# Patient Record
Sex: MALE | URBAN - METROPOLITAN AREA
[De-identification: names, ages, dates, MRNs, and addresses within clinical notes are randomized per-mention and may not be internally consistent; named-entity substitution may affect disease eponyms.]

---

## 2023-01-09 ENCOUNTER — OFFICE VISIT (OUTPATIENT)
Dept: URBAN - METROPOLITAN AREA CLINIC 84 | Facility: CLINIC | Age: 48
End: 2023-01-09
Payer: COMMERCIAL

## 2023-01-09 ENCOUNTER — DASHBOARD ENCOUNTERS (OUTPATIENT)
Age: 48
End: 2023-01-09

## 2023-01-09 VITALS
BODY MASS INDEX: 48.12 KG/M2 | SYSTOLIC BLOOD PRESSURE: 126 MMHG | HEIGHT: 67 IN | WEIGHT: 306.6 LBS | DIASTOLIC BLOOD PRESSURE: 83 MMHG | HEART RATE: 96 BPM | TEMPERATURE: 97.6 F

## 2023-01-09 DIAGNOSIS — Z12.11 COLON CANCER SCREENING: ICD-10-CM

## 2023-01-09 PROCEDURE — 99203 OFFICE O/P NEW LOW 30 MIN: CPT | Performed by: INTERNAL MEDICINE

## 2023-01-09 PROCEDURE — 99202 OFFICE O/P NEW SF 15 MIN: CPT | Performed by: INTERNAL MEDICINE

## 2023-01-09 RX ORDER — FLUTICASONE PROPIONATE 50 UG/1
SPRAY, METERED NASAL
Qty: 16 EACH | Status: ON HOLD | COMMUNITY

## 2023-01-09 RX ORDER — PAROXETINE HYDROCHLORIDE HEMIHYDRATE 40 MG/1
TABLET, FILM COATED ORAL
Qty: 45 TABLET | Status: ON HOLD | COMMUNITY

## 2023-01-09 RX ORDER — ATORVASTATIN CALCIUM 10 MG/1
TABLET, FILM COATED ORAL
Qty: 90 EACH | Status: ACTIVE | COMMUNITY

## 2023-01-09 RX ORDER — DULAGLUTIDE 0.75 MG/.5ML
INJECT 0.5 ML'S UNDER SKIN EVERY 7 DAYS FOR 30 DAYS INJECTION, SOLUTION SUBCUTANEOUS
Qty: 2 MILLILITER | Refills: 0 | Status: ON HOLD | COMMUNITY

## 2023-01-09 RX ORDER — INSULIN ASPART 100 [IU]/ML
INJECTION, SOLUTION INTRAVENOUS; SUBCUTANEOUS
Qty: 30 MILLILITER | Status: ACTIVE | COMMUNITY

## 2023-01-09 RX ORDER — SODIUM, POTASSIUM,MAG SULFATES 17.5-3.13G
354 ML AS DIRECTED SOLUTION, RECONSTITUTED, ORAL ORAL
Qty: 1 KIT | Refills: 0 | OUTPATIENT
Start: 2023-01-09 | End: 2023-01-10

## 2023-01-09 RX ORDER — INSULIN GLARGINE 100 [IU]/ML
INJECTION, SOLUTION SUBCUTANEOUS
Qty: 30 MILLILITER | Status: ACTIVE | COMMUNITY

## 2023-01-09 RX ORDER — LISINOPRIL 10 MG/1
TABLET ORAL
Qty: 60 TABLET | Status: ACTIVE | COMMUNITY

## 2023-01-09 NOTE — HPI-TODAY'S VISIT:
46 yo presents to the clinic for colon cancer screening. Change in bowel habits attributed to starting ozempic a few weeks ago, he notes that his bowels regulate once he stops taking his ozempic. Notes loose stools on ozempic  No change in weight No change in appetite No n/v No BRBPR, no melena No abdominal pain No SOB, no CP Last colonoscopy: Never had  No known family h/o colon cancer/polyps.

## 2023-02-01 ENCOUNTER — OFFICE VISIT (OUTPATIENT)
Dept: URBAN - METROPOLITAN AREA SURGERY CENTER 20 | Facility: SURGERY CENTER | Age: 48
End: 2023-02-01

## 2023-02-08 ENCOUNTER — OFFICE VISIT (OUTPATIENT)
Dept: URBAN - METROPOLITAN AREA SURGERY CENTER 20 | Facility: SURGERY CENTER | Age: 48
End: 2023-02-08

## 2023-02-28 ENCOUNTER — TELEPHONE ENCOUNTER (OUTPATIENT)
Dept: URBAN - METROPOLITAN AREA CLINIC 84 | Facility: CLINIC | Age: 48
End: 2023-02-28

## 2023-03-01 ENCOUNTER — OFFICE VISIT (OUTPATIENT)
Dept: URBAN - METROPOLITAN AREA SURGERY CENTER 20 | Facility: SURGERY CENTER | Age: 48
End: 2023-03-01

## 2025-02-28 ENCOUNTER — LAB OUTSIDE AN ENCOUNTER (OUTPATIENT)
Dept: URBAN - METROPOLITAN AREA CLINIC 29 | Facility: CLINIC | Age: 50
End: 2025-02-28

## 2025-02-28 ENCOUNTER — OFFICE VISIT (OUTPATIENT)
Dept: URBAN - METROPOLITAN AREA CLINIC 29 | Facility: CLINIC | Age: 50
End: 2025-02-28

## 2025-02-28 VITALS
HEIGHT: 67 IN | SYSTOLIC BLOOD PRESSURE: 159 MMHG | HEART RATE: 101 BPM | WEIGHT: 315 LBS | BODY MASS INDEX: 49.44 KG/M2 | DIASTOLIC BLOOD PRESSURE: 93 MMHG

## 2025-02-28 PROBLEM — 429969003: Status: ACTIVE | Noted: 2025-02-28

## 2025-02-28 RX ORDER — FLUTICASONE PROPIONATE 50 UG/1
SPRAY, METERED NASAL
Qty: 16 EACH | Status: ON HOLD | COMMUNITY

## 2025-02-28 RX ORDER — LISINOPRIL 10 MG/1
TABLET ORAL
Qty: 60 TABLET | Status: DISCONTINUED | COMMUNITY

## 2025-02-28 RX ORDER — INSULIN ASPART 100 [IU]/ML
INJECTION, SOLUTION INTRAVENOUS; SUBCUTANEOUS
Qty: 30 MILLILITER | Status: ACTIVE | COMMUNITY

## 2025-02-28 RX ORDER — DULAGLUTIDE 0.75 MG/.5ML
INJECT 0.5 ML'S UNDER SKIN EVERY 7 DAYS FOR 30 DAYS INJECTION, SOLUTION SUBCUTANEOUS
Qty: 2 MILLILITER | Refills: 0 | Status: ON HOLD | COMMUNITY

## 2025-02-28 RX ORDER — SOD SULF/POT CHLORIDE/MAG SULF 1.479 G
12 TABLETS THE FIRST DOSE THE EVENING BEFORE AND SECOND DOSE THE MORNING OF COLONOSCOPY TABLET ORAL TWICE A DAY
Qty: 24 | Refills: 0 | OUTPATIENT
Start: 2025-02-28 | End: 2025-03-01

## 2025-02-28 RX ORDER — ATORVASTATIN CALCIUM 10 MG/1
TABLET, FILM COATED ORAL
Qty: 90 EACH | Status: DISCONTINUED | COMMUNITY

## 2025-02-28 RX ORDER — PAROXETINE HYDROCHLORIDE HEMIHYDRATE 40 MG/1
TABLET, FILM COATED ORAL
Qty: 45 TABLET | Status: ON HOLD | COMMUNITY

## 2025-02-28 RX ORDER — INSULIN GLARGINE 100 [IU]/ML
INJECTION, SOLUTION SUBCUTANEOUS
Qty: 30 MILLILITER | Status: ACTIVE | COMMUNITY

## 2025-02-28 NOTE — HPI-TODAY'S VISIT:
Mr. Huitron is a 49-year-old -American male who presents today per referral by Dr. Smith for colon cancer screening.  He has never had a colonoscopy.  He denies any family history of colon cancer, but reports that his mother had colon polyps removed.  Otherwise, he has no other GI complaints.  His medical history is significant for hypertension, diabetes mellitus and obesity.

## 2025-03-24 ENCOUNTER — OFFICE VISIT (OUTPATIENT)
Dept: URBAN - METROPOLITAN AREA SURGERY CENTER 7 | Facility: SURGERY CENTER | Age: 50
End: 2025-03-24

## 2025-05-06 ENCOUNTER — OFFICE VISIT (OUTPATIENT)
Dept: URBAN - METROPOLITAN AREA SURGERY CENTER 7 | Facility: SURGERY CENTER | Age: 50
End: 2025-05-06

## 2025-05-20 ENCOUNTER — OFFICE VISIT (OUTPATIENT)
Dept: URBAN - METROPOLITAN AREA MEDICAL CENTER 8 | Facility: MEDICAL CENTER | Age: 50
End: 2025-05-20
Payer: COMMERCIAL

## 2025-05-20 DIAGNOSIS — Z12.11 COLON CANCER SCREENING: ICD-10-CM

## 2025-05-20 DIAGNOSIS — Z53.8 FAILED ATTEMPTED SURGICAL PROCEDURE: ICD-10-CM

## 2025-05-20 DIAGNOSIS — Z83.719 FAMILY HISTORY OF COLON POLYPS, UNSPECIFIED: ICD-10-CM

## 2025-05-20 PROCEDURE — G0105 COLORECTAL SCRN; HI RISK IND: HCPCS | Performed by: INTERNAL MEDICINE

## 2025-08-18 ENCOUNTER — OFFICE VISIT (OUTPATIENT)
Dept: URBAN - METROPOLITAN AREA MEDICAL CENTER 8 | Facility: MEDICAL CENTER | Age: 50
End: 2025-08-18

## 2025-08-29 ENCOUNTER — LAB OUTSIDE AN ENCOUNTER (OUTPATIENT)
Dept: URBAN - METROPOLITAN AREA CLINIC 29 | Facility: CLINIC | Age: 50
End: 2025-08-29

## 2025-08-29 ENCOUNTER — OFFICE VISIT (OUTPATIENT)
Dept: URBAN - METROPOLITAN AREA CLINIC 29 | Facility: CLINIC | Age: 50
End: 2025-08-29
Payer: COMMERCIAL

## 2025-08-29 DIAGNOSIS — E11.9 TYPE 2 DIABETES MELLITUS WITHOUT COMPLICATION, WITHOUT LONG-TERM CURRENT USE OF INSULIN: ICD-10-CM

## 2025-08-29 DIAGNOSIS — I10 PRIMARY HYPERTENSION: ICD-10-CM

## 2025-08-29 DIAGNOSIS — Z83.719 FAMILY HX COLONIC POLYPS: ICD-10-CM

## 2025-08-29 PROCEDURE — 99214 OFFICE O/P EST MOD 30 MIN: CPT | Performed by: INTERNAL MEDICINE

## 2025-08-29 RX ORDER — LISINOPRIL 10 MG/1
1 TABLET TABLET ORAL ONCE A DAY
Status: ACTIVE | COMMUNITY

## 2025-08-29 RX ORDER — INSULIN GLARGINE 100 [IU]/ML
INJECTION, SOLUTION SUBCUTANEOUS
Qty: 30 MILLILITER | Status: ACTIVE | COMMUNITY

## 2025-08-29 RX ORDER — FLUTICASONE PROPIONATE 50 UG/1
SPRAY, METERED NASAL
Qty: 16 EACH | Status: ON HOLD | COMMUNITY

## 2025-08-29 RX ORDER — PAROXETINE HYDROCHLORIDE HEMIHYDRATE 40 MG/1
TABLET, FILM COATED ORAL
Qty: 45 TABLET | Status: ON HOLD | COMMUNITY

## 2025-08-29 RX ORDER — DULAGLUTIDE 0.75 MG/.5ML
INJECT 0.5 ML'S UNDER SKIN EVERY 7 DAYS FOR 30 DAYS INJECTION, SOLUTION SUBCUTANEOUS
Qty: 2 MILLILITER | Refills: 0 | Status: ACTIVE | COMMUNITY

## 2025-08-29 RX ORDER — INSULIN ASPART 100 [IU]/ML
INJECTION, SOLUTION INTRAVENOUS; SUBCUTANEOUS
Qty: 30 MILLILITER | Status: ACTIVE | COMMUNITY